# Patient Record
Sex: FEMALE | Race: WHITE | NOT HISPANIC OR LATINO | URBAN - METROPOLITAN AREA
[De-identification: names, ages, dates, MRNs, and addresses within clinical notes are randomized per-mention and may not be internally consistent; named-entity substitution may affect disease eponyms.]

---

## 2017-03-07 ENCOUNTER — OFFICE VISIT - HEALTHEAST (OUTPATIENT)
Dept: FAMILY MEDICINE | Facility: CLINIC | Age: 27
End: 2017-03-07

## 2017-03-07 ENCOUNTER — COMMUNICATION - HEALTHEAST (OUTPATIENT)
Dept: TELEHEALTH | Facility: CLINIC | Age: 27
End: 2017-03-07

## 2017-03-07 DIAGNOSIS — M25.571 RIGHT ANKLE PAIN, UNSPECIFIED CHRONICITY: ICD-10-CM

## 2017-03-07 DIAGNOSIS — Z00.00 HEALTH CARE MAINTENANCE: ICD-10-CM

## 2017-03-07 DIAGNOSIS — R61 NIGHT SWEATS: ICD-10-CM

## 2017-03-07 DIAGNOSIS — Z87.898 HX OF SYNCOPE: ICD-10-CM

## 2017-03-07 DIAGNOSIS — Q17.9 EAR ANOMALY: ICD-10-CM

## 2017-03-07 LAB
ATRIAL RATE - MUSE: 55 BPM
CHOLEST SERPL-MCNC: 220 MG/DL
DIASTOLIC BLOOD PRESSURE - MUSE: NORMAL MMHG
FASTING STATUS PATIENT QL REPORTED: YES
HDLC SERPL-MCNC: 63 MG/DL
INTERPRETATION ECG - MUSE: NORMAL
LDLC SERPL CALC-MCNC: 132 MG/DL
P AXIS - MUSE: 53 DEGREES
PR INTERVAL - MUSE: 172 MS
QRS DURATION - MUSE: 88 MS
QT - MUSE: 440 MS
QTC - MUSE: 420 MS
R AXIS - MUSE: 40 DEGREES
SYSTOLIC BLOOD PRESSURE - MUSE: NORMAL MMHG
T AXIS - MUSE: 21 DEGREES
TRIGL SERPL-MCNC: 125 MG/DL
VENTRICULAR RATE- MUSE: 55 BPM

## 2017-03-07 RX ORDER — ESCITALOPRAM OXALATE 10 MG/1
TABLET ORAL
Status: SHIPPED | COMMUNITY
Start: 2016-12-13

## 2017-03-07 ASSESSMENT — MIFFLIN-ST. JEOR: SCORE: 1265.97

## 2017-03-09 ENCOUNTER — COMMUNICATION - HEALTHEAST (OUTPATIENT)
Dept: AUDIOLOGY | Facility: CLINIC | Age: 27
End: 2017-03-09

## 2017-03-15 ENCOUNTER — COMMUNICATION - HEALTHEAST (OUTPATIENT)
Dept: ADMINISTRATIVE | Facility: CLINIC | Age: 27
End: 2017-03-15

## 2017-03-17 ENCOUNTER — RECORDS - HEALTHEAST (OUTPATIENT)
Dept: ADMINISTRATIVE | Facility: OTHER | Age: 27
End: 2017-03-17

## 2017-03-17 ENCOUNTER — OFFICE VISIT - HEALTHEAST (OUTPATIENT)
Dept: AUDIOLOGY | Facility: CLINIC | Age: 27
End: 2017-03-17

## 2017-03-17 DIAGNOSIS — H72.92 TYMPANIC MEMBRANE PERFORATION, LEFT: ICD-10-CM

## 2017-03-21 ENCOUNTER — COMMUNICATION - HEALTHEAST (OUTPATIENT)
Dept: FAMILY MEDICINE | Facility: CLINIC | Age: 27
End: 2017-03-21

## 2017-03-22 ENCOUNTER — AMBULATORY - HEALTHEAST (OUTPATIENT)
Dept: FAMILY MEDICINE | Facility: CLINIC | Age: 27
End: 2017-03-22

## 2017-03-22 DIAGNOSIS — Z30.9 CONTRACEPTIVE MANAGEMENT: ICD-10-CM

## 2017-04-17 ENCOUNTER — COMMUNICATION - HEALTHEAST (OUTPATIENT)
Dept: ADMINISTRATIVE | Facility: CLINIC | Age: 27
End: 2017-04-17

## 2017-04-25 ENCOUNTER — OFFICE VISIT - HEALTHEAST (OUTPATIENT)
Dept: AUDIOLOGY | Facility: CLINIC | Age: 27
End: 2017-04-25

## 2017-04-25 DIAGNOSIS — H72.92 TYMPANIC MEMBRANE PERFORATION, LEFT: ICD-10-CM

## 2017-06-14 ENCOUNTER — COMMUNICATION - HEALTHEAST (OUTPATIENT)
Dept: FAMILY MEDICINE | Facility: CLINIC | Age: 27
End: 2017-06-14

## 2017-06-14 DIAGNOSIS — Z30.9 CONTRACEPTIVE MANAGEMENT: ICD-10-CM

## 2017-09-25 ENCOUNTER — COMMUNICATION - HEALTHEAST (OUTPATIENT)
Dept: FAMILY MEDICINE | Facility: CLINIC | Age: 27
End: 2017-09-25

## 2017-10-06 ENCOUNTER — OFFICE VISIT - HEALTHEAST (OUTPATIENT)
Dept: FAMILY MEDICINE | Facility: CLINIC | Age: 27
End: 2017-10-06

## 2017-10-06 DIAGNOSIS — Z71.84 TRAVEL ADVICE ENCOUNTER: ICD-10-CM

## 2017-10-06 RX ORDER — FERROUS SULFATE 325(65) MG
1 TABLET ORAL
Status: SHIPPED | COMMUNITY
Start: 2017-10-06

## 2018-03-12 ENCOUNTER — RECORDS - HEALTHEAST (OUTPATIENT)
Dept: ADMINISTRATIVE | Facility: OTHER | Age: 28
End: 2018-03-12

## 2018-07-19 ENCOUNTER — RECORDS - HEALTHEAST (OUTPATIENT)
Dept: ADMINISTRATIVE | Facility: OTHER | Age: 28
End: 2018-07-19

## 2018-11-14 ENCOUNTER — RECORDS - HEALTHEAST (OUTPATIENT)
Dept: ADMINISTRATIVE | Facility: OTHER | Age: 28
End: 2018-11-14

## 2018-12-06 ENCOUNTER — RECORDS - HEALTHEAST (OUTPATIENT)
Dept: ADMINISTRATIVE | Facility: OTHER | Age: 28
End: 2018-12-06

## 2019-01-03 ENCOUNTER — COMMUNICATION - HEALTHEAST (OUTPATIENT)
Dept: ADMINISTRATIVE | Facility: CLINIC | Age: 29
End: 2019-01-03

## 2019-01-09 ENCOUNTER — OFFICE VISIT - HEALTHEAST (OUTPATIENT)
Dept: FAMILY MEDICINE | Facility: CLINIC | Age: 29
End: 2019-01-09

## 2019-01-09 DIAGNOSIS — M67.951 TENDINOPATHY OF RIGHT GLUTEAL REGION: ICD-10-CM

## 2019-01-09 DIAGNOSIS — M76.891 HAMSTRING TENDONITIS OF RIGHT THIGH: ICD-10-CM

## 2019-01-09 ASSESSMENT — MIFFLIN-ST. JEOR: SCORE: 1317.56

## 2019-01-14 ENCOUNTER — COMMUNICATION - HEALTHEAST (OUTPATIENT)
Dept: FAMILY MEDICINE | Facility: CLINIC | Age: 29
End: 2019-01-14

## 2019-01-22 ENCOUNTER — COMMUNICATION - HEALTHEAST (OUTPATIENT)
Dept: FAMILY MEDICINE | Facility: CLINIC | Age: 29
End: 2019-01-22

## 2019-03-18 ENCOUNTER — COMMUNICATION - HEALTHEAST (OUTPATIENT)
Dept: FAMILY MEDICINE | Facility: CLINIC | Age: 29
End: 2019-03-18

## 2019-03-22 ENCOUNTER — COMMUNICATION - HEALTHEAST (OUTPATIENT)
Dept: FAMILY MEDICINE | Facility: CLINIC | Age: 29
End: 2019-03-22

## 2019-03-22 ENCOUNTER — OFFICE VISIT - HEALTHEAST (OUTPATIENT)
Dept: FAMILY MEDICINE | Facility: CLINIC | Age: 29
End: 2019-03-22

## 2019-03-22 DIAGNOSIS — Z00.00 ROUTINE GENERAL MEDICAL EXAMINATION AT A HEALTH CARE FACILITY: ICD-10-CM

## 2019-03-22 DIAGNOSIS — M67.951 TENDINOPATHY OF RIGHT GLUTEAL REGION: ICD-10-CM

## 2019-03-22 DIAGNOSIS — Z86.39 HX OF HYPERLIPIDEMIA: ICD-10-CM

## 2019-03-22 DIAGNOSIS — Z30.9 CONTRACEPTIVE MANAGEMENT: ICD-10-CM

## 2019-03-22 DIAGNOSIS — N90.89 LABIAL LESION: ICD-10-CM

## 2019-03-22 DIAGNOSIS — Z12.4 CERVICAL CANCER SCREENING: ICD-10-CM

## 2019-03-22 DIAGNOSIS — E66.3 OVERWEIGHT (BMI 25.0-29.9): ICD-10-CM

## 2019-03-22 DIAGNOSIS — M76.891 HAMSTRING TENDONITIS OF RIGHT THIGH: ICD-10-CM

## 2019-03-22 LAB
ANION GAP SERPL CALCULATED.3IONS-SCNC: 8 MMOL/L (ref 5–18)
BUN SERPL-MCNC: 15 MG/DL (ref 8–22)
CALCIUM SERPL-MCNC: 9.7 MG/DL (ref 8.5–10.5)
CHLORIDE BLD-SCNC: 102 MMOL/L (ref 98–107)
CHOLEST SERPL-MCNC: 257 MG/DL
CO2 SERPL-SCNC: 28 MMOL/L (ref 22–31)
CREAT SERPL-MCNC: 0.83 MG/DL (ref 0.6–1.1)
FASTING STATUS PATIENT QL REPORTED: YES
GFR SERPL CREATININE-BSD FRML MDRD: >60 ML/MIN/1.73M2
GLUCOSE BLD-MCNC: 82 MG/DL (ref 70–125)
HDLC SERPL-MCNC: 90 MG/DL
LDLC SERPL CALC-MCNC: 151 MG/DL
POTASSIUM BLD-SCNC: 4.6 MMOL/L (ref 3.5–5)
SODIUM SERPL-SCNC: 138 MMOL/L (ref 136–145)
TRIGL SERPL-MCNC: 78 MG/DL

## 2019-03-22 ASSESSMENT — MIFFLIN-ST. JEOR: SCORE: 1333.73

## 2019-03-25 LAB
BKR LAB AP ABNORMAL BLEEDING: NO
BKR LAB AP BIRTH CONTROL/HORMONES: NORMAL
BKR LAB AP CERVICAL APPEARANCE: NORMAL
BKR LAB AP GYN ADEQUACY: NORMAL
BKR LAB AP GYN INTERPRETATION: NORMAL
BKR LAB AP HPV REFLEX: NORMAL
BKR LAB AP LMP: NORMAL
BKR LAB AP PATIENT STATUS: NORMAL
BKR LAB AP PREVIOUS ABNORMAL: NORMAL
BKR LAB AP PREVIOUS NORMAL: 2016
HIGH RISK?: NO
PATH REPORT.COMMENTS IMP SPEC: NORMAL
RESULT FLAG (HE HISTORICAL CONVERSION): NORMAL

## 2019-09-12 ENCOUNTER — COMMUNICATION - HEALTHEAST (OUTPATIENT)
Dept: FAMILY MEDICINE | Facility: CLINIC | Age: 29
End: 2019-09-12

## 2019-09-12 DIAGNOSIS — Z30.41 ENCOUNTER FOR SURVEILLANCE OF CONTRACEPTIVE PILLS: ICD-10-CM

## 2019-11-15 ENCOUNTER — COMMUNICATION - HEALTHEAST (OUTPATIENT)
Dept: FAMILY MEDICINE | Facility: CLINIC | Age: 29
End: 2019-11-15

## 2019-12-02 ENCOUNTER — OFFICE VISIT - HEALTHEAST (OUTPATIENT)
Dept: FAMILY MEDICINE | Facility: CLINIC | Age: 29
End: 2019-12-02

## 2019-12-02 DIAGNOSIS — Z71.84 COUNSELING ABOUT TRAVEL: ICD-10-CM

## 2019-12-02 ASSESSMENT — MIFFLIN-ST. JEOR: SCORE: 1323.69

## 2020-09-07 ENCOUNTER — COMMUNICATION - HEALTHEAST (OUTPATIENT)
Dept: FAMILY MEDICINE | Facility: CLINIC | Age: 30
End: 2020-09-07

## 2020-09-07 DIAGNOSIS — Z30.41 ENCOUNTER FOR SURVEILLANCE OF CONTRACEPTIVE PILLS: ICD-10-CM

## 2021-01-14 ENCOUNTER — COMMUNICATION - HEALTHEAST (OUTPATIENT)
Dept: FAMILY MEDICINE | Facility: CLINIC | Age: 31
End: 2021-01-14

## 2021-01-14 DIAGNOSIS — Z30.41 ENCOUNTER FOR SURVEILLANCE OF CONTRACEPTIVE PILLS: ICD-10-CM

## 2021-01-14 RX ORDER — NORGESTIMATE AND ETHINYL ESTRADIOL 0.25-0.035
KIT ORAL
Qty: 84 TABLET | Refills: 0 | Status: SHIPPED | OUTPATIENT
Start: 2021-01-14

## 2021-05-26 NOTE — PROGRESS NOTES
Assessment/Plan:   Kirti is a 28 year old female here for physical.    1. Routine general medical examination at a health care facility  Physical completed today.  Up-to-date on immunizations.  Labs ordered as below.  Pap smear completed today.  - Basic Metabolic Panel    2. Overweight (BMI 25.0-29.9)  BMI 25.56.  Patient is already eating healthy and exercises very regularly.  Discussed continuing this and attempts to lose/maintain weight.    3. Cervical cancer screening  Due for cervical cancer screening today, this was completed  - Gynecologic Cytology (PAP Smear)    4. Hamstring tendonitis of right thigh  5. Tendinopathy of right gluteal region  Patient was initially seen for this issue on 19, PT order placed at that time but issues with insurance coverage resulted in delay in starting this.  Patient was able to start physical therapy at the beginning of February but states it is due to /complete in April and she feels that she needs more time for this, new referral placed to help facilitate this.  - Ambulatory referral to PT/OT    6. Hx of hyperlipidemia  History of elevated total cholesterol and LDL levels, will recheck today  - Lipid [facility] FASTING    7.  Labial lesion  3 dark colored macules on right labia majora, patient unsure if this is related to vitiligo or how long she has had these.  Patient wishes to monitor these at this time; discussed that if any change is occurring patient needs to be referred to dermatology.      I have had an Advance Directives discussion with the patient.  The following high BMI interventions were performed this visit: encouragement to exercise and lifestyle education regarding diet    Follow up: 1 yr for physical    Dee Guevara MD  [facility]    Subjective:     Kirti Beltrán is a 28 y.o. female who presents for an annual exam.     PT referral:  -insurance requests referral to [facility] as they can also do dry needling and are in network  -started  February and due to run out in April and worried that needs more time for improvement    Healthy Habits:   Healthy Diet: Yes  Regular Exercise: still limited d/t leg pain but pain issues - running 3x/wk  Sunscreen Use: Yes  Dental Visits Regularly: Yes  Seat Belt: Yes  Domestic abuse:   No    Health Maintenance reviewed:  Lipid Profile: due  Glucose Screen: due  Colonoscopy: no  Mammogram: no    Gynecologic History  Patient's last menstrual period was 03/19/2019. some bleeding but not strong, regular  Contraception: OCP (estrogen/progesterone)  Last Pap: 2016. Results were: normal  No hx abnormal    Immunization History   Administered Date(s) Administered     HPV Quadrivalent 10/03/2006, 12/04/2006, 05/08/2007     Hep A, Adult IM (19yr & older) 06/20/2013, 10/06/2017     Hep B, Peds or Adolescent 01/18/1999, 08/17/1999     MMR 06/13/2001     Meningococcal,Historic,Unknown serogroups 10/03/2006     Td, Adult, Absorbed 03/10/2004     Tdap 12/21/2011     Typhoid Live, Oral 06/20/2013     Immunization status: up to date and documented.    Current Outpatient Medications   Medication Sig Dispense Refill     cholecalciferol, vitamin D3, 2,000 unit capsule Take 2,000 Units by mouth daily.       escitalopram oxalate (LEXAPRO) 10 MG tablet        ferrous sulfate (IRON) 325 (65 FE) MG tablet Take 1 tablet by mouth daily with breakfast.       MONONESSA, 28, 0.25-35 mg-mcg per tablet TK 1 T PO D 3 Package 3     No current facility-administered medications for this visit.      History reviewed. No pertinent past medical history.  Past Surgical History:   Procedure Laterality Date     BUNIONECTOMY  2005     CYST REMOVAL Right 2011    right middle finger     TONSILLECTOMY  2002     TYMPANOPLASTY  1997     Coly-mycin s [neomycin-colistin-hc]; Sulfa (sulfonamide antibiotics); and Wellbutrin [bupropion hcl]  Family History   Problem Relation Age of Onset     Hypertension Mother      Heart disease Father      Heart attack Father 53      No Medical Problems Sister      No Medical Problems Brother      No Medical Problems Maternal Grandmother      Diabetes Maternal Grandfather      Dialysis Maternal Grandfather      No Medical Problems Paternal Grandmother      Other Paternal Grandfather         jared gerigs disease     No Medical Problems Brother      No Medical Problems Brother      Social History     Socioeconomic History     Marital status:      Spouse name: Not on file     Number of children: Not on file     Years of education: Not on file     Highest education level: Not on file   Occupational History     Occupation:    Social Needs     Financial resource strain: Not on file     Food insecurity:     Worry: Not on file     Inability: Not on file     Transportation needs:     Medical: Not on file     Non-medical: Not on file   Tobacco Use     Smoking status: Never Smoker     Smokeless tobacco: Never Used   Substance and Sexual Activity     Alcohol use: Yes     Alcohol/week: 0.6 - 1.2 oz     Types: 1 - 2 Glasses of wine per week     Frequency: 2-3 times a week     Drinks per session: 1 or 2     Binge frequency: Less than monthly     Drug use: No     Sexual activity: Yes     Partners: Male     Birth control/protection: Pill   Lifestyle     Physical activity:     Days per week: Not on file     Minutes per session: Not on file     Stress: Not on file   Relationships     Social connections:     Talks on phone: Not on file     Gets together: Not on file     Attends Anglican service: Not on file     Active member of club or organization: Not on file     Attends meetings of clubs or organizations: Not on file     Relationship status: Not on file     Intimate partner violence:     Fear of current or ex partner: Not on file     Emotionally abused: Not on file     Physically abused: Not on file     Forced sexual activity: Not on file   Other Topics Concern     Not on file   Social History Narrative     Not on file       Review of  "Systems  General:  Denies problem except overweight  Eyes: Denies problem  Ears/Nose/Throat: Denies problem except left side decreased hearing  Cardiovascular: Denies problem  Respiratory:  Denies problem  Gastrointestinal:  Denies problem   Genitourinary: Denies problem   Musculoskeletal:  Denies problem except right thigh tendinopathy  Skin: Denies problem  Neurologic: Denies problem  Psychiatric: Denies problem  Endocrine: Denies problem  Heme/Lymphatic: Denies problem   Allergic/Immunologic: Denies problem    Objective:        Vitals:    03/22/19 0800   BP: 100/60   Pulse: 65   Weight: 144 lb 5 oz (65.5 kg)   Height: 5' 3\" (1.6 m)     Body mass index is 25.56 kg/m .    Physical Exam:  General Appearance: Alert, pleasant, appears stated age  Head: Normocephalic, without obvious abnormality  Eyes: PERRL, conjunctiva/corneas clear, EOM's intact  Ears: Normal TM's and external ear canals, both ears  Nose: Nares normal, septum midline,mucosa normal, no drainage  Throat: Lips, mucosa, and tongue normal; teeth and gums normal; oropharynx is clear  Neck: Supple,without lymphadenopathy, no thyromegaly or nodules noted  Lungs: Clear to auscultation bilaterally, respirations unlabored, no wheezing or crackles  Heart: Regular rate and rhythm, no murmur   Breast: Deferred per patient's request  Abdomen: Soft, non-tender, no masses, no organomegaly  Extremities: Extremities with strong and symmetric pulses, no cyanosis or edema  Skin: Skin color, texture normal, no rashes or lesions  Neurologic: Grossly normal, no focal deficits  Pelvic exam: External female genitalia, there is some skin discoloration of the vagina and perineum consistent with patient's history of vitiligo, there are 3 dark colored macules on right labia majora, no significant discharge noted, minimal bleeding from menstrual cycle, normal-appearing cervix    "

## 2021-05-26 NOTE — PATIENT INSTRUCTIONS - HE
Thinking about starting a family:  -technically can get pregnant right away after stopping birth control - for some can take up to 3 cycles/months to get into normal ovulation  -recommend downloading ronny on phone to track ovulation - Inder is a good one  -goal is to have sex on the day of ovulation and days surrounding  -when you stop the birth control you should start taking prenatal vitamins    Keep an eye on vaginal/labial moles - every couple weeks or once/month  If noticing changes, send Dr Guevara a [employer] message

## 2021-05-30 VITALS — BODY MASS INDEX: 24.29 KG/M2 | HEIGHT: 62 IN | WEIGHT: 132 LBS

## 2021-05-31 VITALS — BODY MASS INDEX: 25.56 KG/M2 | WEIGHT: 140.9 LBS

## 2021-06-02 VITALS — BODY MASS INDEX: 25.57 KG/M2 | WEIGHT: 144.31 LBS | HEIGHT: 63 IN

## 2021-06-02 VITALS — HEIGHT: 62 IN | BODY MASS INDEX: 26.54 KG/M2 | WEIGHT: 144.25 LBS
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
## 2021-06-04 VITALS
DIASTOLIC BLOOD PRESSURE: 62 MMHG | BODY MASS INDEX: 25.18 KG/M2 | OXYGEN SATURATION: 99 % | HEART RATE: 53 BPM | WEIGHT: 142.1 LBS | SYSTOLIC BLOOD PRESSURE: 90 MMHG | HEIGHT: 63 IN

## 2021-06-09 NOTE — PROGRESS NOTES
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 She wants a new custom swimmer's earplug to replace one that is now too small.     Otoscopy of the left ear yielded a clear canal with no visible otorrhea or infection. Monaural (left ear only) impression was taken without incident; post-impression otoscopy was also unremarkable. Press model AQ custom silicone swim mold will be ordered in purple/turquoise swirl, with a removal handle. Fabrication usually takes about one month. Once mold is in, Ms. Beltrán will be contacted to set up fitting appointment (30 minute [facility] appointment) to verify fit is appropriate and she is comfortable using the earplug. Purchase agreement was completed and signed, and a copy was provided to Ms. Beltrán.    Morales Correa, [facility]  [place] Licensed Audiologist [id number]

## 2021-06-10 NOTE — PROGRESS NOTES
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 She presented for fitting of her custom earmold for the left ear.    Otoscopy was unremarkable for the left ear. Physical fit of the mold was good, and Ms. Beltrán did not indicate any discomfort with the mold in place. She was shown how to insert and remove the mold from her ear; she was quickly able to demonstrate proficiency in the task. Payment ($65.00) was received at [facility] . She was asked to follow up as soon as possible with any fit questions or difficulties, and to let me know if she had additional questions.    Morales Correa, [facility]  [place] Licensed Audiologist [id number]

## 2021-06-11 NOTE — TELEPHONE ENCOUNTER
Refill Request  Did you contact pharmacy: Yes  Medication name:   Requested Prescriptions     Pending Prescriptions Disp Refills     norgestimate-ethinyl estradioL (ORTHO-CYCLEN) 0.25-35 mg-mcg per tablet 84 tablet 3     Sig: Take 1 tablet by mouth daily.     Who prescribed the medication: Dee Guevara MD  Requested Pharmacy: [facility]  Is patient out of medication: N/A  Patient notified refills processed in 3 business days:  N/A  Okay to leave a detailed message: yes

## 2021-06-11 NOTE — TELEPHONE ENCOUNTER
Refill Approved    Rx renewed per Medication Renewal Policy. Medication was last renewed on 9/13/19.    Kelsey Conteh, [facility] Triage/Med Refill 9/8/2020     Requested Prescriptions   Pending Prescriptions Disp Refills     norgestimate-ethinyl estradioL (ORTHO-CYCLEN) 0.25-35 mg-mcg per tablet 84 tablet 3     Sig: Take 1 tablet by mouth daily.       Oral Contraceptives Protocol Passed - 9/7/2020  6:15 PM        Passed - Visit with PCP or prescribing provider visit in last 12 months      Last office visit with prescriber/PCP: 1/9/2019 Dee Guevara MD OR same dept: 12/2/2019 Rose Reeder CNP OR same specialty: 12/2/2019 Rose Reeder CNP  Last physical: 3/22/2019 Last MTM visit: Visit date not found   Next visit within 3 mo: Visit date not found  Next physical within 3 mo: Visit date not found  Prescriber OR PCP: Dee Guevara MD  Last diagnosis associated with med order: 1. Encounter for surveillance of contraceptive pills  - norgestimate-ethinyl estradioL (ORTHO-CYCLEN) 0.25-35 mg-mcg per tablet; Take 1 tablet by mouth daily.  Dispense: 84 tablet; Refill: 3    If protocol passes may refill for 12 months if within 3 months of last provider visit (or a total of 15 months).

## 2021-06-13 NOTE — PROGRESS NOTES
Kirti Beltrán is a 27 y.o. female who is here for a travel consult.    Destination: [facility]  Departure date: 12/16/2017  Return date: 1/4/2017    Kirti Beltrán  has no physical complaints today.      Review of Systems   Constitutional: Negative.   HENT: Negative.   Eyes: Negative.   Respiratory: Negative.   Cardiovascular: Negative.   Gastrointestinal: Negative.   Endocrine: Negative.   Genitourinary: Negative.   Musculoskeletal: Negative.   Skin: Negative.   Allergic/Immunologic: Negative.   Neurological: Negative.   Hematological: Negative.   Psychiatric/Behavioral: Negative.       Allergies   Allergen Reactions     Coly-Mycin S [Neomycin-Colistin-Hc] Unknown     Sulfa (Sulfonamide Antibiotics) Hives     Wellbutrin [Bupropion Hcl] Unknown     Current Outpatient Prescriptions   Medication Sig Dispense Refill     escitalopram oxalate (LEXAPRO) 10 MG tablet        ferrous sulfate (IRON) 325 (65 FE) MG tablet Take 1 tablet by mouth daily with breakfast.       MONONESSA, 28, 0.25-35 mg-mcg per tablet TK 1 T PO D 3 Package 3     CALCIUM CARBONATE (CALCIUM 600 ORAL) Take by mouth.       No current facility-administered medications for this visit.      No past medical history on file.  Past Surgical History:   Procedure Laterality Date     BUNIONECTOMY  2005     TONSILLECTOMY  2002     TYMPANOPLASTY  1997     Social History     Social History     Marital status:      Spouse name: N/A     Number of children: N/A     Years of education: N/A     Occupational History           Social History Main Topics     Smoking status: Never Smoker     Smokeless tobacco: Not on file     Alcohol use 0.6 - 1.2 oz/week     1 - 2 Glasses of wine per week     Drug use: Not on file     Sexual activity: Not on file     Other Topics Concern     Not on file     Social History Narrative     No narrative on file     Family History   Problem Relation Age of Onset     Hypertension Mother      Heart disease Father       Vitals:    10/06/17 0947   BP: 116/66   Patient Site: Left Arm   Patient Position: Sitting   Cuff Size: Adult Regular   Pulse: 60   Weight: 140 lb 14.4 oz (63.9 kg)         general:  alert, oriented x 3, pleasant, NAD  SHEENT:  no skin rash/lesions, NC/AT, EOMI, WENDY, no nasal discharge, no CLAD  Mouth:  moist mucosal membrane, no oral lesion  Resp:  ctab  CVS:  rrr, no murmur  Abd:  soft, NT/ND, NABS, no CVA tenderness  Ext:  cap refill < 3 sec on all nailbeds, no bipedal edema    Immunizations reviewed. She did have Typhoid oral in 2014 and this is every 5 years, she is up to date.   Will administer Hep A today, she states all other childhood immunizations are up to date.    Discussed risks, benefits, and side effects of immunizations  Discussed travel safety and prevention:  malaria chemoprophylaxis.    We discussed food and waterborne precautions.    Personal safety, sunscreen, insect precautions, traveler's diarrhea prevention.  CDC travel information provided

## 2021-06-14 NOTE — TELEPHONE ENCOUNTER
RN cannot approve Refill Request    RN can NOT refill this medication PCP messaged that patient is overdue for Office Visit. Last office visit: 1/9/2019 Dee Guevara MD Last Physical: 3/22/2019 Last MTM visit: Visit date not found Last visit same specialty: 12/2/2019 Rose Reeder CNP.  Next visit within 3 mo: Visit date not found  Next physical within 3 mo: Visit date not found      Jasmin Harris, [facility] Triage/Med Refill 1/14/2021    Requested Prescriptions   Pending Prescriptions Disp Refills     SPRINTEC, 28, 0.25-35 mg-mcg per tablet [Pharmacy Med Name: SPRINTEC 28 DAY TABLET] 84 tablet 0     Sig: TAKE 1 TABLET BY MOUTH EVERY DAY       Oral Contraceptives Protocol Failed - 1/14/2021  7:49 AM        Failed - Visit with PCP or prescribing provider visit in last 12 months      Last office visit with prescriber/PCP: 1/9/2019 Dee Guevara MD OR same dept: Visit date not found OR same specialty: 12/2/2019 Rose Reeder CNP  Last physical: 3/22/2019 Last MTM visit: Visit date not found   Next visit within 3 mo: Visit date not found  Next physical within 3 mo: Visit date not found  Prescriber OR PCP: Dee Guevara MD  Last diagnosis associated with med order: 1. Encounter for surveillance of contraceptive pills  - SPRINTEC, 28, 0.25-35 mg-mcg per tablet [Pharmacy Med Name: SPRINTEC 28 DAY TABLET]; TAKE 1 TABLET BY MOUTH EVERY DAY  Dispense: 84 tablet; Refill: 0    If protocol passes may refill for 12 months if within 3 months of last provider visit (or a total of 15 months).

## 2021-06-14 NOTE — TELEPHONE ENCOUNTER
Spoke with patient and she is no longer a pt at [facility]. She will contact her pharmacy and make them aware of her new physician and clinic.

## 2021-06-19 ENCOUNTER — RECORDS - HEALTHEAST (OUTPATIENT)
Dept: FAMILY MEDICINE | Facility: CLINIC | Age: 31
End: 2021-06-19

## 2021-06-19 DIAGNOSIS — Z30.41 ENCOUNTER FOR SURVEILLANCE OF CONTRACEPTIVE PILLS: ICD-10-CM

## 2021-06-21 RX ORDER — NORGESTIMATE AND ETHINYL ESTRADIOL 0.25-0.035
KIT ORAL
Qty: 84 TABLET | Refills: 0 | Status: SHIPPED | OUTPATIENT
Start: 2021-06-21

## 2021-06-22 NOTE — TELEPHONE ENCOUNTER
I received a voicemail from Anneliese at [facility] asking for a [facility] insurance referral.  I left her message explaining that I am unable to do a referral as [facility] is out of network.  Kirti needs to choose a PT facility that is in the  network, ie  [facility], [facility], [facility], [facility], [facility].
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
## 2021-06-23 NOTE — TELEPHONE ENCOUNTER
Jimmy FRANCO recommendation request bk from  Medical Review team- services for [facility] have been denied as services for dry needling can be provided at [facility] which is part of the  [facility]. Redirecting pt to [facility].   MyChart message sent to Kirti as I was unable to leave a voice mail due to it being full.

## 2021-06-26 NOTE — TELEPHONE ENCOUNTER
RN cannot approve Refill Request    RN can NOT refill this medication PCP messaged that patient is overdue for Office Visit. Last office visit: 1/9/2019 Dee Guevara MD Last Physical: 3/22/2019 Last MTM visit: Visit date not found Last visit same specialty: 12/2/2019 Rose Reeder CNP.  Next visit within 3 mo: Visit date not found  Next physical within 3 mo: Visit date not found      Jasmin Harrsi, [facility] Triage/Med Refill 6/19/2021    Requested Prescriptions   Pending Prescriptions Disp Refills     LAURENCE 0.25-35 mg-mcg per tablet [Pharmacy Med Name: LAURENCE 0.25-0.035 MG TABLET] 84 tablet 0     Sig: TAKE 1 TABLET BY MOUTH EVERY DAY       Oral Contraceptives Protocol Failed - 6/19/2021  9:27 AM        Failed - Visit with PCP or prescribing provider visit in last 12 months      Last office visit with prescriber/PCP: 1/9/2019 Dee Guevara MD OR same dept: Visit date not found OR same specialty: 12/2/2019 Rose Reeder CNP  Last physical: 3/22/2019 Last MTM visit: Visit date not found   Next visit within 3 mo: Visit date not found  Next physical within 3 mo: Visit date not found  Prescriber OR PCP: Dee Guevara MD  Last diagnosis associated with med order: 1. Encounter for surveillance of contraceptive pills  - LAURENCE 0.25-35 mg-mcg per tablet [Pharmacy Med Name: LAURENCE 0.25-0.035 MG TABLET]; TAKE 1 TABLET BY MOUTH EVERY DAY  Dispense: 84 tablet; Refill: 0    If protocol passes may refill for 12 months if within 3 months of last provider visit (or a total of 15 months).

## 2021-06-26 NOTE — TELEPHONE ENCOUNTER
Left message to call back for: pt  Information to relay to patient:  2nd message to call and schedule physical with pcp before next refill request.

## 2021-06-27 ENCOUNTER — HEALTH MAINTENANCE LETTER (OUTPATIENT)
Age: 31
End: 2021-06-27

## 2022-07-24 ENCOUNTER — HEALTH MAINTENANCE LETTER (OUTPATIENT)
Age: 32
End: 2022-07-24

## 2022-10-02 ENCOUNTER — HEALTH MAINTENANCE LETTER (OUTPATIENT)
Age: 32
End: 2022-10-02

## 2023-08-12 ENCOUNTER — HEALTH MAINTENANCE LETTER (OUTPATIENT)
Age: 33
End: 2023-08-12